# Patient Record
Sex: MALE | Race: OTHER
[De-identification: names, ages, dates, MRNs, and addresses within clinical notes are randomized per-mention and may not be internally consistent; named-entity substitution may affect disease eponyms.]

---

## 2019-03-18 ENCOUNTER — HOSPITAL ENCOUNTER (EMERGENCY)
Dept: HOSPITAL 61 - ER | Age: 49
Discharge: HOME | End: 2019-03-18
Payer: SELF-PAY

## 2019-03-18 VITALS — SYSTOLIC BLOOD PRESSURE: 155 MMHG | DIASTOLIC BLOOD PRESSURE: 109 MMHG

## 2019-03-18 VITALS — WEIGHT: 175 LBS | BODY MASS INDEX: 29.16 KG/M2 | HEIGHT: 65 IN
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
## 2019-03-18 DIAGNOSIS — S62.615A: Primary | ICD-10-CM
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
## 2019-03-18 DIAGNOSIS — S62.635A: ICD-10-CM

## 2019-03-18 PROCEDURE — 73130 X-RAY EXAM OF HAND: CPT

## 2019-03-18 PROCEDURE — 90471 IMMUNIZATION ADMIN: CPT

## 2019-03-18 PROCEDURE — 90715 TDAP VACCINE 7 YRS/> IM: CPT

## 2019-03-18 PROCEDURE — 73140 X-RAY EXAM OF FINGER(S): CPT

## 2019-03-18 PROCEDURE — 26725 TREAT FINGER FRACTURE EACH: CPT

## 2019-03-18 NOTE — RAD
FINGER(S) LEFT

 

History: POST REDUCTION 4TH DIGIT OF LEFT HAND

 

Comparison: Exam earlier the same day

 

Findings:

3 views of the left hand with attention to the fourth digit are submitted.

There is improved alignment at site of previously demonstrated fracture of

the proximal one third shaft of the fourth proximal phalanx. There is 

again somewhat displaced oblique fracture of the distal fourth phalanx, 

proximal alignment displaced by about 1 shaft width anteriorly compared 

with the distal fragment, some overriding of fracture fragments.. There is

now overlying splint material.

 

Impression: 

 

1.  There are fractures of the fourth proximal and distal phalanges. There

is some displacement overriding of the distal phalanx fracture fragments.

 

Electronically signed by: David Duran MD (3/18/2019 10:58 PM) [facility]

## 2019-03-18 NOTE — RAD
HAND LEFT 3V

 

History: 4TH DIGIT INJURY TO LEFT HAND

 

Comparison: None.

 

Findings:

3 views of the left hand are submitted. There is a comminuted displaced 

fracture involving the proximal one third shaft of the fourth proximal 

phalanx, overriding of fracture fragments, angulation with apex directed 

anteriorly, proximal aspect displaced ulnar and posterior direction by 

about three fourths shaft width. There is also slightly displaced oblique 

fracture involving the mid to distal fourth distal phalanx.

 

Impression: 

 

1.  There is comminuted displaced and angulated fracture of the fourth 

proximal phalanx and also somewhat displaced fracture of the fourth distal

phalanx.

 

Electronically signed by: David Duran MD (3/18/2019 10:40 PM) [facility]

## 2019-03-18 NOTE — PHYS DOC
Past Medical History


Past Medical History:  No Pertinent History





Adult General


Chief Complaint


Chief Complaint:  FINGER INJURY





HPI


HPI


Patient is a 49-year-old male who presents to the emergency department for 

evaluation. He states he was removing an engine from a vehicle when the engine 

slipped, and apparently fell, and injured his left ring finger. He has a 

deformity to his ring finger, with volar deformity of the proximal aspect of 

the proximal phalanx, with some extension of the DIP joint.  the patient denies 

any injury to any other digit. Palpation of the injured digit causes pain. The 

patient denies any numbness or weakness distally. He is uncertain of his last 

tetanus.





Review of Systems


Review of Systems





Musculoskeletal: Denies back pain or joint pain except as noted in the history 

of present illness []


Neurologic: Denies headache, focal weakness or sensory changes []





Current Medications


Current Medications





Current Medications








 Medications


  (Trade)  Dose


 Ordered  Sig/Elva  Start Time


 Stop Time Status Last Admin


Dose Admin


 


 Cephalexin HCl


  (Keflex)  500 mg  1X  ONCE  3/18/19 20:15


 3/18/19 20:16 DC  





 


 Diphtheria/


 Tetanus/Acell


 Pertussis


  (Boostrix)  0.5 ml  ONCE ONCE  3/18/19 17:45


 3/18/19 18:13 DC 3/18/19 18:48


0.5 ML


 


 Lidocaine HCl


  (Lidocaine 1%


 20ml Vial)  20 ml  1X  ONCE  3/18/19 18:30


 3/18/19 18:31 DC 3/18/19 18:47


20 ML


 


 Neomycin/


 Polymyxin/


 Bacitracin


  (Triple


 Antibiotic


 Ointment)  1 pkt  STK-MED ONCE  3/18/19 19:42


 3/18/19 19:43 DC  





 


 Oxycodone/


 Acetaminophen


  (Percocet 5/325)  1 tab  1X  ONCE  3/18/19 17:45


 3/18/19 18:13 DC 3/18/19 18:46


1 TAB











Allergies


Allergies





Allergies








Coded Allergies Type Severity Reaction Last Updated Verified


 


  No Known Drug Allergies    3/18/19 No











Physical Exam


Physical Exam


PHYSICAL  EXAM:


HEENT: Atruamatic


NECK: Supple, normal ROM, non-tender.


CARDIAC:  Regular Rate and Rhythm


LUNGS: Clear Bilaterally


EXTREMITIES: There is a deformity noted to the left ring finger, with a small 

punctate superficial laceration on the dorsal aspect of the skin. The MCP joint 

itself and the remainder the left hand are nontender. There is sensation intact 

distally on the left ring finger, with good capillary refill and strong pulse 

oximeter tracing. The remainder of the digits and extremities are atraumatic.





Current Patient Data


Vital Signs





 Vital Signs








  Date Time  Temp Pulse Resp B/P (MAP) Pulse Ox O2 Delivery O2 Flow Rate FiO2


 


3/18/19 18:46   16  98 Room Air  


 


3/18/19 17:15 98.6 81  155/109 (124)    





 98.6       











EKG


EKG


[]





Radiology/Procedures


Radiology/Procedures


[Physician preliminary finger x-ray interpretation, shows a dorsally angulated 

proximal phalanx fracture, with a distal nondisplaced tuft fracture. No other 

fractures noted.








Postreduction x-ray shows improved alignment of the proximal phalanx fracture. 

However the distal tuft fracture appears dorsally angulated, likely due to the 

positioning of the finger in the splint. The splint was volarly flexed after 

the postreduction x-ray was performed, to alleviate distention.





Course & Med Decision Making


Course & Med Decision Making


Pertinen imaging studies reviewed. (See chart for details)





[8:40 PM: I spoke with Dr. Conrad, hand surgeon at . I discussed the case, 

and the possibility of open fracture. He felt that since it was small, cleaning 

the wound as has been done, and starting the patient on antibiotics is 

appropriate. He will see the patient in follow-up.]











FRACTURE REDUCTION PROCEDURE NOTE:


The patient had a metacarpal block placed using 1% lidocaine, with adequate 

anesthesia. The finger was manually reduced, with manual traction. The patient 

tolerated procedure well. Aluminum splint was placed on the volar surface of 

the finger. Prior to splint application, the patient's wound was cleansed with 

saline and Shur-Clens, and a bandage with antibiotic ointment placed.





Dragon Disclaimer


Dragon Disclaimer


This electronic medical record was generated, in whole or in part, using a 

voice recognition dictation system.





Departure


Departure


Impression:  


 Primary Impression:  


 Finger fracture


Disposition:  01 HOME, SELF-CARE


Condition:  STABLE


Patient Instructions:  Finger Fracture (Phalangeal)-[facility]





Additional Instructions:  


Follow-up with Dr. Conrad, hand surgery at , call [phone] to schedule 

appointment.


Scripts


Cephalexin (KEFLEX) 500 Mg Capsule


500 MG PO QID for 7 Days, #28 CAP


   Prov: MANDI HOOD MD         3/18/19











MANDI HOOD MD Mar 18, 2019 17:44